# Patient Record
Sex: FEMALE | Race: WHITE | NOT HISPANIC OR LATINO | Employment: FULL TIME | URBAN - METROPOLITAN AREA
[De-identification: names, ages, dates, MRNs, and addresses within clinical notes are randomized per-mention and may not be internally consistent; named-entity substitution may affect disease eponyms.]

---

## 2022-10-19 ENCOUNTER — INITIAL PRENATAL (OUTPATIENT)
Dept: OBSTETRICS AND GYNECOLOGY | Facility: CLINIC | Age: 27
End: 2022-10-19
Payer: MEDICAID

## 2022-10-19 ENCOUNTER — PATIENT MESSAGE (OUTPATIENT)
Dept: OBSTETRICS AND GYNECOLOGY | Facility: CLINIC | Age: 27
End: 2022-10-19

## 2022-10-19 ENCOUNTER — TELEPHONE (OUTPATIENT)
Dept: OBSTETRICS AND GYNECOLOGY | Facility: CLINIC | Age: 27
End: 2022-10-19

## 2022-10-19 VITALS
WEIGHT: 188.5 LBS | BODY MASS INDEX: 31.4 KG/M2 | HEIGHT: 65 IN | HEART RATE: 120 BPM | DIASTOLIC BLOOD PRESSURE: 75 MMHG | SYSTOLIC BLOOD PRESSURE: 124 MMHG

## 2022-10-19 DIAGNOSIS — R00.0 TACHYCARDIA: ICD-10-CM

## 2022-10-19 DIAGNOSIS — R00.0 TACHYCARDIA: Primary | ICD-10-CM

## 2022-10-19 DIAGNOSIS — Z3A.26 26 WEEKS GESTATION OF PREGNANCY: ICD-10-CM

## 2022-10-19 DIAGNOSIS — O26.849 SIZE OF FETUS INCONSISTENT WITH DATES, ANTEPARTUM: Primary | ICD-10-CM

## 2022-10-19 LAB
AMPHET+METHAMPHET UR QL: NEGATIVE
BARBITURATES UR QL SCN>200 NG/ML: NEGATIVE
BENZODIAZ UR QL SCN>200 NG/ML: NEGATIVE
BZE UR QL SCN: NEGATIVE
CANNABINOIDS UR QL SCN: NEGATIVE
CREAT UR-MCNC: 108.7 MG/DL (ref 15–325)
METHADONE UR QL SCN>300 NG/ML: NEGATIVE
OPIATES UR QL SCN: NEGATIVE
PCP UR QL SCN>25 NG/ML: NEGATIVE
TOXICOLOGY INFORMATION: NORMAL

## 2022-10-19 PROCEDURE — 99204 OFFICE O/P NEW MOD 45 MIN: CPT | Mod: TH,S$GLB,,

## 2022-10-19 PROCEDURE — 87491 CHLMYD TRACH DNA AMP PROBE: CPT

## 2022-10-19 PROCEDURE — 99204 PR OFFICE/OUTPT VISIT, NEW, LEVL IV, 45-59 MIN: ICD-10-PCS | Mod: TH,S$GLB,,

## 2022-10-19 PROCEDURE — 87086 URINE CULTURE/COLONY COUNT: CPT

## 2022-10-19 PROCEDURE — 80307 DRUG TEST PRSMV CHEM ANLYZR: CPT

## 2022-10-19 PROCEDURE — 87591 N.GONORRHOEAE DNA AMP PROB: CPT

## 2022-10-19 PROCEDURE — 87147 CULTURE TYPE IMMUNOLOGIC: CPT

## 2022-10-19 PROCEDURE — 87088 URINE BACTERIA CULTURE: CPT

## 2022-10-19 NOTE — PROGRESS NOTES
"  10/19/2022  27 y.o. 26w0d Estimated Date of Delivery: 23, per 1st trimester u/s at 11w 6d on 22. Transfer from [facility].   OB History    Para Term  AB Living   1             SAB IAB Ectopic Multiple Live Births                  # Outcome Date GA Lbr Paul/2nd Weight Sex Delivery Anes PTL Lv   1 Current                 Here for scheduled SUMI visit. Doing well.  No lof/brvb, dysuria, fever/chills, nausea or emesis. No S&S of pre eclampsia or COIVD 19. Good FM noted. No cramps/regular contractions. Calm, pleasant, NAD. ROS negative with exception of aforementioned:    Allergies:  None    PMH:  None    Surghx:  [place] teeth extraction  Breast reduction    SOCHX:  Denies tobacco, etoh, substance use.    OBHX:    Hx of breast reduction, pt intersted in seeing lactation consultant  Size greater than dates, pt states she weighed 10# at birth.    LABS:  A pos/ ABS neg  CBC 11.2 hgb; plt 357  Rubella immune  HIV Neg  Hep B C Neg  RPR Non-reactive  GC/CHL neg  Urine cx no growth  Pap on 10/22/21 NILM  M21 Negative x 3, Male  Repeat GC/CHL, Urine cx, UDS today  GTT, CBC, Sickle cell next visit    Review of Systems:  General ROS: negative for headache or visual changes  Breast ROS: negative for breast lumps  Gastrointestinal ROS: negative for constipation, diarrhea or nausea/vomiting  Musculoskeletal ROS: negative for pain in joints or swelling in face or hands.   Neurological ROS: negative for - headaches, numbness/tingling or visual changes      Physical Exam:  /75   Pulse (!) 120   Ht 5' 5" (1.651 m)   Wt 85.5 kg (188 lb 8 oz)   LMP 2022   BMI 31.37 kg/m²   Urine Dip: neg/neg  FHT: Fetal Heart Rate: 130s    Constitutional: She is oriented to person, place, and time. She appears well-developed and well-nourished. No distress.   Pulmonary/Chest: Effort normal. No respiratory distress. + tachycardia. Pt desires EKG she wants to see her aunt who works at [employer], referral " placed.   Abdominal: Soft, gravid, nontender. No rebound and no guarding. Fundal Height: Fundal Height (cm): 28 cm S>D  Genitourinary: Deferred   Musculoskeletal: Normal range of motion. Minimal peripheral edema.   Neurological: She is alert and oriented to person, place, and time. Coordination normal. Gait smooth and steady  Skin: Skin is warm and dry. She is not diaphoretic.  Psychiatric: She has a normal mood and affect.      Assessment:   27 y.o., at 26w0d Gestation   Patient Active Problem List   Diagnosis    26 weeks gestation of pregnancy    Size of fetus inconsistent with dates, antepartum    Tachycardia     Current Outpatient Medications on File Prior to Visit   Medication Sig Dispense Refill    prenatal 25/iron fum/folic/dha (PRENATAL-1 ORAL) Take 1 tablet by mouth once daily.       No current facility-administered medications on file prior to visit.       Plan:  1. Oriented to our collaborative group.  2. S&S of PTL/PPROM  reinforced.  3. Continue home meds/daily PNV.  4. NIPT negative x 3, male.  5. EIF on Anatomy u/s otherwise normal.  6. Size greater than dates, growth u/s ordered.  7. Urine cx, GC/CHL, UDS today.  8. GTT and CBC at next visit.  9. Mild tachycardia at today's visit. HR range from  predominantly at 120. Pt desires cardiology referral for EKG.  10. SUMI 2 weeks.

## 2022-10-20 LAB
C TRACH DNA SPEC QL NAA+PROBE: NOT DETECTED
N GONORRHOEA DNA SPEC QL NAA+PROBE: NOT DETECTED

## 2022-10-21 ENCOUNTER — PATIENT MESSAGE (OUTPATIENT)
Dept: OBSTETRICS AND GYNECOLOGY | Facility: CLINIC | Age: 27
End: 2022-10-21
Payer: MEDICAID

## 2022-10-21 DIAGNOSIS — N30.00 ACUTE CYSTITIS WITHOUT HEMATURIA: Primary | ICD-10-CM

## 2022-10-21 LAB — BACTERIA UR CULT: ABNORMAL

## 2022-10-21 RX ORDER — AMPICILLIN 500 MG/1
500 CAPSULE ORAL 3 TIMES DAILY
Qty: 21 CAPSULE | Refills: 0 | Status: SHIPPED | OUTPATIENT
Start: 2022-10-21 | End: 2022-10-28

## 2022-10-21 RX ORDER — AMPICILLIN 500 MG/1
500 CAPSULE ORAL 3 TIMES DAILY
Qty: 21 CAPSULE | Refills: 0 | Status: SHIPPED | OUTPATIENT
Start: 2022-10-21 | End: 2022-10-21 | Stop reason: SDUPTHER

## 2022-10-25 ENCOUNTER — TELEPHONE (OUTPATIENT)
Dept: OBSTETRICS AND GYNECOLOGY | Facility: CLINIC | Age: 27
End: 2022-10-25
Payer: MEDICAID

## 2022-10-25 NOTE — TELEPHONE ENCOUNTER
Encounter summary along with provider NPI faxed to requested number.    Simple: Patient demonstrates quick and easy understanding

## 2022-11-01 ENCOUNTER — PATIENT MESSAGE (OUTPATIENT)
Dept: OBSTETRICS AND GYNECOLOGY | Facility: CLINIC | Age: 27
End: 2022-11-01

## 2022-11-01 ENCOUNTER — ROUTINE PRENATAL (OUTPATIENT)
Dept: OBSTETRICS AND GYNECOLOGY | Facility: CLINIC | Age: 27
End: 2022-11-01
Payer: MEDICAID

## 2022-11-01 ENCOUNTER — LAB VISIT (OUTPATIENT)
Dept: LAB | Facility: CLINIC | Age: 27
End: 2022-11-01
Payer: MEDICAID

## 2022-11-01 VITALS
DIASTOLIC BLOOD PRESSURE: 73 MMHG | BODY MASS INDEX: 32.05 KG/M2 | SYSTOLIC BLOOD PRESSURE: 132 MMHG | WEIGHT: 192.63 LBS | HEART RATE: 101 BPM

## 2022-11-01 DIAGNOSIS — Z3A.26 26 WEEKS GESTATION OF PREGNANCY: ICD-10-CM

## 2022-11-01 DIAGNOSIS — Z3A.27 27 WEEKS GESTATION OF PREGNANCY: Primary | ICD-10-CM

## 2022-11-01 DIAGNOSIS — D50.8 OTHER IRON DEFICIENCY ANEMIA: Primary | ICD-10-CM

## 2022-11-01 DIAGNOSIS — O26.849 SIZE OF FETUS INCONSISTENT WITH DATES, ANTEPARTUM: ICD-10-CM

## 2022-11-01 DIAGNOSIS — R00.0 TACHYCARDIA: ICD-10-CM

## 2022-11-01 LAB
BASOPHILS # BLD AUTO: 0.11 K/UL (ref 0–0.2)
BASOPHILS NFR BLD: 0.7 % (ref 0–1.9)
DIFFERENTIAL METHOD: ABNORMAL
EOSINOPHIL # BLD AUTO: 0.1 K/UL (ref 0–0.5)
EOSINOPHIL NFR BLD: 0.9 % (ref 0–8)
ERYTHROCYTE [DISTWIDTH] IN BLOOD BY AUTOMATED COUNT: 14.9 % (ref 11.5–14.5)
GLUCOSE SERPL-MCNC: 98 MG/DL (ref 70–140)
HCT VFR BLD AUTO: 31.3 % (ref 37–48.5)
HGB BLD-MCNC: 9.3 G/DL (ref 12–16)
HGB S BLD QL SOLY: NEGATIVE
IMM GRANULOCYTES # BLD AUTO: 0.77 K/UL (ref 0–0.04)
IMM GRANULOCYTES NFR BLD AUTO: 4.7 % (ref 0–0.5)
LYMPHOCYTES # BLD AUTO: 2.1 K/UL (ref 1–4.8)
LYMPHOCYTES NFR BLD: 12.6 % (ref 18–48)
MCH RBC QN AUTO: 22 PG (ref 27–31)
MCHC RBC AUTO-ENTMCNC: 29.7 G/DL (ref 32–36)
MCV RBC AUTO: 74 FL (ref 82–98)
MONOCYTES # BLD AUTO: 0.8 K/UL (ref 0.3–1)
MONOCYTES NFR BLD: 4.9 % (ref 4–15)
NEUTROPHILS # BLD AUTO: 12.4 K/UL (ref 1.8–7.7)
NEUTROPHILS NFR BLD: 76.2 % (ref 38–73)
NRBC BLD-RTO: 0 /100 WBC
PLATELET # BLD AUTO: 296 K/UL (ref 150–450)
PMV BLD AUTO: 10.3 FL (ref 9.2–12.9)
RBC # BLD AUTO: 4.22 M/UL (ref 4–5.4)
WBC # BLD AUTO: 16.23 K/UL (ref 3.9–12.7)

## 2022-11-01 PROCEDURE — 85660 RBC SICKLE CELL TEST: CPT

## 2022-11-01 PROCEDURE — 36415 COLL VENOUS BLD VENIPUNCTURE: CPT | Mod: ,,, | Performed by: STUDENT IN AN ORGANIZED HEALTH CARE EDUCATION/TRAINING PROGRAM

## 2022-11-01 PROCEDURE — 99213 OFFICE O/P EST LOW 20 MIN: CPT | Mod: TH,S$GLB,,

## 2022-11-01 PROCEDURE — 85025 COMPLETE CBC W/AUTO DIFF WBC: CPT

## 2022-11-01 PROCEDURE — 82950 GLUCOSE TEST: CPT

## 2022-11-01 PROCEDURE — 99213 PR OFFICE/OUTPT VISIT, EST, LEVL III, 20-29 MIN: ICD-10-PCS | Mod: TH,S$GLB,,

## 2022-11-01 PROCEDURE — 36415 PR COLLECTION VENOUS BLOOD,VENIPUNCTURE: ICD-10-PCS | Mod: ,,, | Performed by: STUDENT IN AN ORGANIZED HEALTH CARE EDUCATION/TRAINING PROGRAM

## 2022-11-01 RX ORDER — FERROUS SULFATE 325(65) MG
325 TABLET, DELAYED RELEASE (ENTERIC COATED) ORAL DAILY
Qty: 30 TABLET | Refills: 5 | Status: SHIPPED | OUTPATIENT
Start: 2022-11-01 | End: 2023-04-30

## 2022-11-01 NOTE — PROGRESS NOTES
2022  27 y.o. 27w6d Estimated Date of Delivery: 23, per 1st trimester u/s at 11w 6d on 22. Transfer from [facility].   OB History    Para Term  AB Living   1             SAB IAB Ectopic Multiple Live Births                  # Outcome Date GA Lbr Paul/2nd Weight Sex Delivery Anes PTL Lv   1 Current                 Here for scheduled SUMI visit. Doing well.  No lof/brvb, dysuria, fever/chills, nausea or emesis. No S&S of pre eclampsia or COIVD 19. Good FM noted. No cramps/regular contractions. Calm, pleasant, NAD. ROS negative with exception of aforementioned:    Allergies:  None    PMH:  None    Surghx:  [place] teeth extraction  Breast reduction    SOCHX:  Denies tobacco, etoh, substance use.    OBHX:    Hx of breast reduction, pt intersted in seeing lactation consultant  Size greater than dates, pt states she weighed 10# at birth.    LABS:  A pos/ ABS neg  CBC 11.2 hgb; plt 357  Rubella immune  HIV Neg  Hep B C Neg  RPR Non-reactive  GC/CHL neg  Urine cx no growth  Pap on 10/22/21 NILM  M21 Negative x 3, Male  UDS Neg  GTT, CBC, Sickle cell today    Review of Systems:  General ROS: negative for headache or visual changes  Breast ROS: negative for breast lumps  Gastrointestinal ROS: negative for constipation, diarrhea or nausea/vomiting  Musculoskeletal ROS: negative for pain in joints or swelling in face or hands.   Neurological ROS: negative for - headaches, numbness/tingling or visual changes      Physical Exam:  /73   Pulse 101   Wt 87.4 kg (192 lb 9.6 oz)   LMP 2022   BMI 32.05 kg/m²   Urine Dip: neg/neg  FHT: Fetal Heart Rate: 140s    Constitutional: She is oriented to person, place, and time. She appears well-developed and well-nourished. No distress.   Pulmonary/Chest: Effort normal. No respiratory distress.    Abdominal: Soft, gravid, nontender. No rebound and no guarding. Fundal Height: Fundal Height (cm): 29 cm S>D  Genitourinary: Deferred    Musculoskeletal: Normal range of motion. Minimal peripheral edema.   Neurological: She is alert and oriented to person, place, and time. Coordination normal. Gait smooth and steady  Skin: Skin is warm and dry. She is not diaphoretic.  Psychiatric: She has a normal mood and affect.      Assessment:   27 y.o., at 27w6d Gestation   Patient Active Problem List   Diagnosis    Size of fetus inconsistent with dates, antepartum    Tachycardia    27 weeks gestation of pregnancy     Current Outpatient Medications on File Prior to Visit   Medication Sig Dispense Refill    prenatal 25/iron fum/folic/dha (PRENATAL-1 ORAL) Take 1 tablet by mouth once daily.       No current facility-administered medications on file prior to visit.       Plan:  1. Oriented to our collaborative group.  2. S&S of PTL/PPROM  reinforced.  3. Continue home meds/daily PNV.  4. NIPT negative x 3, male.  5. EIF on Anatomy u/s otherwise normal.  6. Size greater than dates, growth u/s on 11/30/2022.  7. Urine cx, GC/CHL, UDS WNL.  8. GTT and CBC today.  9. Mild tachycardia at last visit. HR range from  predominantly at 120. Pt has appt with Cardiology on 11/9/22.  10. SUMI 3 weeks.

## 2022-11-09 ENCOUNTER — TELEPHONE (OUTPATIENT)
Dept: OBSTETRICS AND GYNECOLOGY | Facility: CLINIC | Age: 27
End: 2022-11-09
Payer: MEDICAID

## 2022-11-30 ENCOUNTER — PROCEDURE VISIT (OUTPATIENT)
Dept: MATERNAL FETAL MEDICINE | Facility: CLINIC | Age: 27
End: 2022-11-30
Payer: MEDICAID

## 2022-11-30 ENCOUNTER — ROUTINE PRENATAL (OUTPATIENT)
Dept: OBSTETRICS AND GYNECOLOGY | Facility: CLINIC | Age: 27
End: 2022-11-30
Payer: MEDICAID

## 2022-11-30 VITALS
WEIGHT: 200.38 LBS | BODY MASS INDEX: 33.35 KG/M2 | DIASTOLIC BLOOD PRESSURE: 81 MMHG | SYSTOLIC BLOOD PRESSURE: 136 MMHG

## 2022-11-30 DIAGNOSIS — O26.849 SIZE OF FETUS INCONSISTENT WITH DATES, ANTEPARTUM: ICD-10-CM

## 2022-11-30 DIAGNOSIS — Z3A.32 32 WEEKS GESTATION OF PREGNANCY: Primary | ICD-10-CM

## 2022-11-30 DIAGNOSIS — Z3A.26 26 WEEKS GESTATION OF PREGNANCY: ICD-10-CM

## 2022-11-30 DIAGNOSIS — R00.0 TACHYCARDIA: ICD-10-CM

## 2022-11-30 PROCEDURE — 76816 OB US FOLLOW-UP PER FETUS: CPT | Mod: S$GLB,,, | Performed by: OBSTETRICS & GYNECOLOGY

## 2022-11-30 PROCEDURE — 99214 OFFICE O/P EST MOD 30 MIN: CPT | Mod: TH,S$GLB,,

## 2022-11-30 PROCEDURE — 99214 PR OFFICE/OUTPT VISIT, EST, LEVL IV, 30-39 MIN: ICD-10-PCS | Mod: TH,S$GLB,,

## 2022-11-30 PROCEDURE — 76816 US OB/GYN PROCEDURE (VIEWPOINT): ICD-10-PCS | Mod: S$GLB,,, | Performed by: OBSTETRICS & GYNECOLOGY

## 2022-11-30 NOTE — PROGRESS NOTES
2022  27 y.o. 32w0d Estimated Date of Delivery: 23, per 1st trimester u/s at 11w 6d on 22. Transfer from [facility].   OB History    Para Term  AB Living   1             SAB IAB Ectopic Multiple Live Births                  # Outcome Date GA Lbr Paul/2nd Weight Sex Delivery Anes PTL Lv   1 Current                 Here for scheduled SUMI visit. Doing well.  No lof/brvb, dysuria, fever/chills, nausea or emesis. No S&S of pre eclampsia or COIVD 19. Good FM noted. No cramps/regular contractions. Calm, pleasant, NAD. ROS negative with exception of aforementioned:    At initial OB patient noted to have tachycardia with regular rhythm. She requested cardiology referral at that time. Pt referred to Dr. Foote for EKG. Pt has been consulted by cardiology and found to have normal EKG. She said she has follow up appt for echocardiogram as well.     Pt had growth ultrasound today due to size greater than dates. U/S at 32w0d EFW 4#14oz, 50%. Normal growth.     Allergies:  None    PMH:  None    Surghx:  [place] teeth extraction  Breast reduction    SOCHX:  Denies tobacco, etoh, substance use.    OBHX:    Hx of breast reduction, pt intersted in seeing lactation consultant  Size greater than dates, pt states she weighed 10# at birth.    LABS:  A pos/ ABS neg  CBC 11.2 hgb; plt 357  Rubella immune  HIV Neg  Hep B C Neg  RPR Non-reactive  GC/CHL neg  Urine cx no growth  Pap on 10/22/21 NILM  M21 Negative x 3, Male  UDS Neg  GTT 98  CBC 9.3/31.3/296 - pt now taking daily ferrous sulfate  Sickle Cell Neg    Review of Systems:  General ROS: negative for headache or visual changes  Breast ROS: negative for breast lumps  Gastrointestinal ROS: negative for constipation, diarrhea or nausea/vomiting  Musculoskeletal ROS: negative for pain in joints or swelling in face or hands.   Neurological ROS: negative for - headaches, numbness/tingling or visual changes      Physical Exam:  /81   Wt 90.9 kg  (200 lb 6.4 oz)   LMP 04/02/2022   BMI 33.35 kg/m²   Urine Dip: neg/trace glucose  FHT: Fetal Heart Rate: 130s    Constitutional: She is oriented to person, place, and time. She appears well-developed and well-nourished. No distress.   Pulmonary/Chest: Effort normal. No respiratory distress.    Abdominal: Soft, gravid, nontender. No rebound and no guarding. Fundal Height: Fundal Height (cm): 33 cm   Genitourinary: Deferred   Musculoskeletal: Normal range of motion. Minimal peripheral edema.   Neurological: She is alert and oriented to person, place, and time. Coordination normal. Gait smooth and steady  Skin: Skin is warm and dry. She is not diaphoretic.  Psychiatric: She has a normal mood and affect.      Assessment:   27 y.o., at 32w0d Gestation   Patient Active Problem List   Diagnosis    Size of fetus inconsistent with dates, antepartum    Tachycardia    32 weeks gestation of pregnancy     Current Outpatient Medications on File Prior to Visit   Medication Sig Dispense Refill    ferrous sulfate 325 (65 FE) MG EC tablet Take 1 tablet (325 mg total) by mouth once daily. 30 tablet 5    prenatal 25/iron fum/folic/dha (PRENATAL-1 ORAL) Take 1 tablet by mouth once daily.       No current facility-administered medications on file prior to visit.       Plan:  Oriented to our collaborative group.  S&S of PTL/PPROM  reinforced.  Continue home meds/daily PNV, Iron.  NIPT negative x 3, male.  EIF on Anatomy u/s otherwise normal.  Size greater than dates, growth u/s at 32w0d 4#14oz, 50%.  Urine cx, GC/CHL, UDS WNL.  Mild tachycardia at initial visit. HR range from  predominantly at 120. Pt had EKG with Dr. Foote WNL per pt report.  SUMI 2 weeks.

## 2022-12-04 PROBLEM — Z3A.32 32 WEEKS GESTATION OF PREGNANCY: Status: ACTIVE | Noted: 2022-12-04

## 2022-12-04 PROBLEM — Z3A.27 27 WEEKS GESTATION OF PREGNANCY: Status: RESOLVED | Noted: 2022-11-01 | Resolved: 2022-12-04

## 2022-12-10 ENCOUNTER — PATIENT MESSAGE (OUTPATIENT)
Dept: OBSTETRICS AND GYNECOLOGY | Facility: CLINIC | Age: 27
End: 2022-12-10
Payer: MEDICAID

## 2022-12-11 ENCOUNTER — PATIENT MESSAGE (OUTPATIENT)
Dept: OBSTETRICS AND GYNECOLOGY | Facility: CLINIC | Age: 27
End: 2022-12-11
Payer: MEDICAID

## 2022-12-11 DIAGNOSIS — B37.9 YEAST INFECTION: Primary | ICD-10-CM

## 2022-12-11 RX ORDER — FLUCONAZOLE 200 MG/1
200 TABLET ORAL DAILY
Qty: 3 TABLET | Refills: 0 | Status: SHIPPED | OUTPATIENT
Start: 2022-12-11 | End: 2022-12-14

## 2022-12-14 ENCOUNTER — ROUTINE PRENATAL (OUTPATIENT)
Dept: OBSTETRICS AND GYNECOLOGY | Facility: CLINIC | Age: 27
End: 2022-12-14
Payer: MEDICAID

## 2022-12-14 VITALS
BODY MASS INDEX: 33.7 KG/M2 | HEART RATE: 131 BPM | DIASTOLIC BLOOD PRESSURE: 68 MMHG | WEIGHT: 202.5 LBS | SYSTOLIC BLOOD PRESSURE: 113 MMHG

## 2022-12-14 DIAGNOSIS — R00.0 TACHYCARDIA: ICD-10-CM

## 2022-12-14 DIAGNOSIS — O26.843 SIZE OF FETUS INCONSISTENT WITH DATES IN THIRD TRIMESTER: ICD-10-CM

## 2022-12-14 DIAGNOSIS — Z3A.34 34 WEEKS GESTATION OF PREGNANCY: Primary | ICD-10-CM

## 2022-12-14 PROBLEM — Z3A.32 32 WEEKS GESTATION OF PREGNANCY: Status: RESOLVED | Noted: 2022-12-04 | Resolved: 2022-12-14

## 2022-12-14 PROCEDURE — 59425 PR ANTEPARTUM CARE ONLY, 4-6 VISITS: ICD-10-PCS | Mod: TH,S$GLB,,

## 2022-12-14 PROCEDURE — 59425 ANTEPARTUM CARE ONLY: CPT | Mod: TH,S$GLB,,

## 2022-12-14 NOTE — PROGRESS NOTES
2022  27 y.o. 34w0d Estimated Date of Delivery: 23, per 1st trimester u/s at 11w 6d on 22. Transfer from [facility].   OB History    Para Term  AB Living   1             SAB IAB Ectopic Multiple Live Births                  # Outcome Date GA Lbr Paul/2nd Weight Sex Delivery Anes PTL Lv   1 Current                 Here for scheduled SUMI visit. Doing well.  No lof/brvb, dysuria, fever/chills, nausea or emesis. No S&S of pre eclampsia or COIVD 19. Good FM noted. No cramps/regular contractions. Calm, pleasant, NAD. ROS negative with exception of aforementioned:    At initial OB patient noted to have tachycardia with regular rhythm. She requested cardiology referral at that time. Pt is seeing Dr. Foote for cardiology. Pt has been consulted by cardiology and found to have normal EKG and echo. She is scheduled for follow up cardiology in 2023.     Pt had growth ultrasound at 32w0d due to size greater than dates. U/S at 32w0d EFW 4#14oz, 50%. Normal growth. Pt remains size greater than dates. Pt herself weighed 10# at birth. Repeat growth u/s ordered for 36 weeks. Pt verbalizes understanding.     Allergies:  None    PMH:  None    Surghx:  [place] teeth extraction  Breast reduction    SOCHX:  Denies tobacco, etoh, substance use.    OBHX:    Hx of breast reduction, pt intersted in seeing lactation consultant  Size greater than dates, pt states she weighed 10# at birth.    LABS:  A pos/ ABS neg  CBC 11.2 hgb; plt 357  Rubella immune  HIV Neg  Hep B C Neg  RPR Non-reactive  GC/CHL neg  Urine cx no growth  Pap on 10/22/21 NILM  M21 Negative x 3, Male  UDS Neg  GTT 98  CBC 9.3/31.3/296 - pt now taking daily ferrous sulfate  Sickle Cell Neg  GBS + RPR Next Visit.     Review of Systems:  General ROS: negative for headache or visual changes  Breast ROS: negative for breast lumps  Gastrointestinal ROS: negative for constipation, diarrhea or nausea/vomiting  Musculoskeletal ROS: negative for  pain in joints or swelling in face or hands.   Neurological ROS: negative for - headaches, numbness/tingling or visual changes      Physical Exam:  /68   Pulse (!) 131   Wt 91.9 kg (202 lb 8 oz)   LMP 04/02/2022   BMI 33.70 kg/m²   Urine Dip: neg/neg  FHT: Fetal Heart Rate: 150s    Constitutional: She is oriented to person, place, and time. She appears well-developed and well-nourished. No distress.   Pulmonary/Chest: Effort normal. No respiratory distress.    Abdominal: Soft, gravid, nontender. No rebound and no guarding. Fundal Height: Fundal Height (cm): 37 cm S>D  Genitourinary: Deferred   Musculoskeletal: Normal range of motion. Minimal peripheral edema.   Neurological: She is alert and oriented to person, place, and time. Coordination normal. Gait smooth and steady  Skin: Skin is warm and dry. She is not diaphoretic.  Psychiatric: She has a normal mood and affect.      Assessment:   27 y.o., at 34w0d Gestation   Patient Active Problem List   Diagnosis    Size of fetus inconsistent with dates, antepartum    Tachycardia    34 weeks gestation of pregnancy     Current Outpatient Medications on File Prior to Visit   Medication Sig Dispense Refill    ferrous sulfate 325 (65 FE) MG EC tablet Take 1 tablet (325 mg total) by mouth once daily. 30 tablet 5    fluconazole (DIFLUCAN) 200 MG Tab Take 1 tablet (200 mg total) by mouth once daily. for 3 days 3 tablet 0    prenatal 25/iron fum/folic/dha (PRENATAL-1 ORAL) Take 1 tablet by mouth once daily.       No current facility-administered medications on file prior to visit.       Plan:  Oriented to our collaborative group.  S&S of PTL/PPROM  reinforced.  Continue home meds/daily PNV, Iron.  NIPT negative x 3, male.  EIF on Anatomy u/s otherwise normal.  Size greater than dates, growth u/s at 32w0d 4#14oz, 50%. Repeat growth at 36 weeks.  Mild tachycardia at initial visit. Pt had EKG and echo with JENNIFER RandallL per pt report.  GBS + RPR Next Visit.  SUMI 2  weeks.

## 2022-12-25 ENCOUNTER — PATIENT MESSAGE (OUTPATIENT)
Dept: OBSTETRICS AND GYNECOLOGY | Facility: CLINIC | Age: 27
End: 2022-12-25
Payer: MEDICAID

## 2022-12-27 ENCOUNTER — PROCEDURE VISIT (OUTPATIENT)
Dept: MATERNAL FETAL MEDICINE | Facility: CLINIC | Age: 27
End: 2022-12-27
Payer: MEDICAID

## 2022-12-27 ENCOUNTER — ROUTINE PRENATAL (OUTPATIENT)
Dept: OBSTETRICS AND GYNECOLOGY | Facility: CLINIC | Age: 27
End: 2022-12-27
Payer: MEDICAID

## 2022-12-27 VITALS
WEIGHT: 199.88 LBS | HEART RATE: 123 BPM | SYSTOLIC BLOOD PRESSURE: 133 MMHG | BODY MASS INDEX: 33.27 KG/M2 | DIASTOLIC BLOOD PRESSURE: 81 MMHG

## 2022-12-27 DIAGNOSIS — R00.0 TACHYCARDIA: ICD-10-CM

## 2022-12-27 DIAGNOSIS — Z3A.34 34 WEEKS GESTATION OF PREGNANCY: ICD-10-CM

## 2022-12-27 DIAGNOSIS — Z3A.35 35 WEEKS GESTATION OF PREGNANCY: Primary | ICD-10-CM

## 2022-12-27 DIAGNOSIS — R09.81 NASAL CONGESTION: ICD-10-CM

## 2022-12-27 DIAGNOSIS — O26.843 SIZE OF FETUS INCONSISTENT WITH DATES IN THIRD TRIMESTER: ICD-10-CM

## 2022-12-27 PROBLEM — O26.849 SIZE OF FETUS INCONSISTENT WITH DATES, ANTEPARTUM: Status: RESOLVED | Noted: 2022-10-19 | Resolved: 2022-12-27

## 2022-12-27 PROCEDURE — 76816 OB US FOLLOW-UP PER FETUS: CPT | Mod: S$GLB,,, | Performed by: OBSTETRICS & GYNECOLOGY

## 2022-12-27 PROCEDURE — 87184 SC STD DISK METHOD PER PLATE: CPT

## 2022-12-27 PROCEDURE — 87081 CULTURE SCREEN ONLY: CPT

## 2022-12-27 PROCEDURE — 59425 ANTEPARTUM CARE ONLY: CPT | Mod: TH,S$GLB,,

## 2022-12-27 PROCEDURE — 87147 CULTURE TYPE IMMUNOLOGIC: CPT

## 2022-12-27 PROCEDURE — 59425 PR ANTEPARTUM CARE ONLY, 4-6 VISITS: ICD-10-PCS | Mod: TH,S$GLB,,

## 2022-12-27 PROCEDURE — 76816 US OB/GYN PROCEDURE (VIEWPOINT): ICD-10-PCS | Mod: S$GLB,,, | Performed by: OBSTETRICS & GYNECOLOGY

## 2022-12-27 RX ORDER — FLUTICASONE PROPIONATE 50 MCG
2 SPRAY, SUSPENSION (ML) NASAL DAILY
Qty: 9.9 ML | Refills: 0 | Status: SHIPPED | OUTPATIENT
Start: 2022-12-27 | End: 2023-01-01

## 2022-12-27 NOTE — PROGRESS NOTES
2022  27 y.o. 35w6d Estimated Date of Delivery: 23, per 1st trimester u/s at 11w 6d on 22. Transfer from [facility].   OB History    Para Term  AB Living   1             SAB IAB Ectopic Multiple Live Births                  # Outcome Date GA Lbr Paul/2nd Weight Sex Delivery Anes PTL Lv   1 Current                 Here for scheduled SUMI visit. Doing well.  No lof/brvb, dysuria, fever/chills, nausea or emesis. No S&S of pre eclampsia or COIVD 19. Good FM noted. No cramps/regular contractions. Calm, pleasant, NAD. ROS negative with exception of aforementioned:    At initial OB patient noted to have tachycardia with regular rhythm. She requested cardiology referral at that time. Pt is seeing Dr. Foote for cardiology. Pt has been consulted by cardiology and found to have normal EKG and echo. She is scheduled for follow up cardiology in 2023.     Growth u/s at 35w6d EFW 6#6oz 44%; AC 57%. Normal interval growth.     Allergies:  None    PMH:  None    Surghx:  [place] teeth extraction  Breast reduction    SOCHX:  Denies tobacco, etoh, substance use.    OBHX:    Hx of breast reduction, pt intersted in seeing lactation consultant  Size greater than dates, pt states she weighed 10# at birth.    LABS:  A pos/ ABS neg  CBC 11.2 hgb; plt 357  Rubella immune  HIV Neg  Hep B C Neg  RPR Non-reactive  GC/CHL neg  Urine cx no growth  Pap on 10/22/21 NILM  M21 Negative x 3, Male  UDS Neg  GTT 98  CBC 9.3/31.3/296 - pt now taking daily ferrous sulfate  Sickle Cell Neg  GBS collected today  RPR and CBC next visit    Review of Systems:  General ROS: negative for headache or visual changes  Breast ROS: negative for breast lumps  Gastrointestinal ROS: negative for constipation, diarrhea or nausea/vomiting  Musculoskeletal ROS: negative for pain in joints or swelling in face or hands.   Neurological ROS: negative for - headaches, numbness/tingling or visual changes      Physical Exam:  /81    Pulse (!) 123   Wt 90.7 kg (199 lb 14.4 oz)   LMP 04/02/2022   BMI 33.27 kg/m²   Urine Dip: neg/neg  FHT: Fetal Heart Rate: 150s    Constitutional: She is oriented to person, place, and time. She appears well-developed and well-nourished. No distress.   Pulmonary/Chest: Effort normal. No respiratory distress.    Abdominal: Soft, gravid, nontender. No rebound and no guarding. Fundal Height: Fundal Height (cm): 36 cm S=D  Genitourinary: VE: 1/50/-3 no blood or fluid on glove. SDuran Camacho present as chaperone.  Musculoskeletal: Normal range of motion. Minimal peripheral edema.   Neurological: She is alert and oriented to person, place, and time. Coordination normal. Gait smooth and steady  Skin: Skin is warm and dry. She is not diaphoretic.  Psychiatric: She has a normal mood and affect.      Assessment:   27 y.o., at 35w6d Gestation   Patient Active Problem List   Diagnosis    Tachycardia    35 weeks gestation of pregnancy     Current Outpatient Medications on File Prior to Visit   Medication Sig Dispense Refill    ferrous sulfate 325 (65 FE) MG EC tablet Take 1 tablet (325 mg total) by mouth once daily. 30 tablet 5    prenatal 25/iron fum/folic/dha (PRENATAL-1 ORAL) Take 1 tablet by mouth once daily.       No current facility-administered medications on file prior to visit.       Plan:  Oriented to our collaborative group.  S&S of PTL/PPROM  reinforced.  Continue home meds/daily PNV, Iron.  NIPT negative x 3, male.  EIF on Anatomy u/s otherwise normal.  Size greater than dates, growth u/s at 32w0d 4#14oz, 50%. Repeat growth at 36 weeks.  Mild tachycardia at initial visit. Pt had EKG and echo with MINH Randall per pt report.  GBS collected today.  RPR and CBC next visit.  Weekly SUMI until delivery.

## 2022-12-29 LAB — BACTERIA SPEC AEROBE CULT: ABNORMAL

## 2023-01-03 ENCOUNTER — PATIENT MESSAGE (OUTPATIENT)
Dept: OBSTETRICS AND GYNECOLOGY | Facility: CLINIC | Age: 28
End: 2023-01-03
Payer: MEDICAID

## 2023-01-04 ENCOUNTER — ROUTINE PRENATAL (OUTPATIENT)
Dept: OBSTETRICS AND GYNECOLOGY | Facility: CLINIC | Age: 28
End: 2023-01-04
Payer: MEDICAID

## 2023-01-04 VITALS
HEART RATE: 126 BPM | WEIGHT: 205.81 LBS | BODY MASS INDEX: 34.25 KG/M2 | DIASTOLIC BLOOD PRESSURE: 78 MMHG | SYSTOLIC BLOOD PRESSURE: 124 MMHG

## 2023-01-04 DIAGNOSIS — B95.1 POSITIVE GBS TEST: ICD-10-CM

## 2023-01-04 DIAGNOSIS — R00.0 TACHYCARDIA: ICD-10-CM

## 2023-01-04 DIAGNOSIS — Z3A.37 37 WEEKS GESTATION OF PREGNANCY: Primary | ICD-10-CM

## 2023-01-04 PROBLEM — Z3A.35 35 WEEKS GESTATION OF PREGNANCY: Status: RESOLVED | Noted: 2022-12-27 | Resolved: 2023-01-04

## 2023-01-04 PROCEDURE — 59425 ANTEPARTUM CARE ONLY: CPT | Mod: TH,S$GLB,,

## 2023-01-04 PROCEDURE — 59425 PR ANTEPARTUM CARE ONLY, 4-6 VISITS: ICD-10-PCS | Mod: TH,S$GLB,,

## 2023-01-04 NOTE — PROGRESS NOTES
2023  27 y.o. 37w0d Estimated Date of Delivery: 23, per 1st trimester u/s at 11w 6d on 22. Transfer from [facility].   OB History    Para Term  AB Living   1             SAB IAB Ectopic Multiple Live Births                  # Outcome Date GA Lbr Paul/2nd Weight Sex Delivery Anes PTL Lv   1 Current              Pt seen in triage yesterday for ?SROM. SROM ruled out in triage. VE was /-3. Pt denies LOF, VB, CTX at this time. Endorses +FM.     Here for scheduled SUMI visit. Doing well.  No lof/brvb, dysuria, fever/chills, nausea or emesis. No S&S of pre eclampsia or COIVD 19. Good FM noted. No cramps/regular contractions. Calm, pleasant, NAD. ROS negative with exception of aforementioned:    At initial OB patient noted to have tachycardia with regular rhythm. She requested cardiology referral at that time. Pt is seeing Dr. Foote for cardiology. Pt has been consulted by cardiology and found to have normal EKG and echo. She is scheduled for follow up cardiology in 2023.     Growth u/s at 35w6d EFW 6#6oz 44%; AC 57%. Normal interval growth.     Birth plan reviewed. Pt desires spontaneous labor. Planning epidural in labor.    Allergies:  None    PMH:  None    Surghx:  [place] teeth extraction  Breast reduction    SOCHX:  Denies tobacco, etoh, substance use.    OBHX:    Hx of breast reduction, pt intersted in seeing lactation consultant  Size greater than dates, pt states she weighed 10# at birth.  Growth u/s at 35w6d EFW 6#6oz 44%; AC 57%. Normal interval growth.   Anemia  Maternal sinus tachycardia; normal EKG and Echo with cardiology    LABS:  A pos/ ABS neg  CBC 11.2 hgb; plt 357  Rubella immune  HIV Neg  Hep B C Neg  RPR Non-reactive  GC/CHL neg  Urine cx no growth  Pap on 10/22/21 NILM  M21 Negative x 3, Male  UDS Neg  GTT 98  CBC 9.3/31.3/296 - pt now taking daily ferrous sulfate  Sickle Cell Neg  GBS POS  RPR and CBC next visit    Review of Systems:  General ROS:  negative for headache or visual changes  Breast ROS: negative for breast lumps  Gastrointestinal ROS: negative for constipation, diarrhea or nausea/vomiting  Musculoskeletal ROS: negative for pain in joints or swelling in face or hands.   Neurological ROS: negative for - headaches, numbness/tingling or visual changes      Physical Exam:  /78   Pulse (!) 126   Wt 93.4 kg (205 lb 12.8 oz)   LMP 04/02/2022   BMI 34.25 kg/m²   Urine Dip: neg/neg  FHT: Fetal Heart Rate: 150s    Constitutional: She is oriented to person, place, and time. She appears well-developed and well-nourished. No distress.   Pulmonary/Chest: Effort normal. No respiratory distress.    Abdominal: Soft, gravid, nontender. No rebound and no guarding. Fundal Height: Fundal Height (cm): 37 cm S=D  Genitourinary: VE: 1/50/-3 no blood or fluid on glove. SDuran Camacho present as chaperone.  Musculoskeletal: Normal range of motion. Minimal peripheral edema.   Neurological: She is alert and oriented to person, place, and time. Coordination normal. Gait smooth and steady  Skin: Skin is warm and dry. She is not diaphoretic.  Psychiatric: She has a normal mood and affect.      Assessment:   27 y.o., at 37w0d Gestation   Patient Active Problem List   Diagnosis    Tachycardia    37 weeks gestation of pregnancy    Positive GBS test     Current Outpatient Medications on File Prior to Visit   Medication Sig Dispense Refill    ferrous sulfate 325 (65 FE) MG EC tablet Take 1 tablet (325 mg total) by mouth once daily. 30 tablet 5    prenatal 25/iron fum/folic/dha (PRENATAL-1 ORAL) Take 1 tablet by mouth once daily.       No current facility-administered medications on file prior to visit.       Plan:  Oriented to our collaborative group.  S&S of Labor/PROM  reinforced.  Continue home meds/daily PNV, Iron.  NIPT negative x 3, male.  EIF on Anatomy u/s otherwise normal.  Size greater than dates, growth u/s at 35w6d 6#6oz, 44%.   Tachycardia at initial visit. Pt had  EKG and echo with Dr. Foote, WNL per pt report.  GBS Pos, intrapartum abx reviewed.  RPR and CBC next visit.  Weekly SUMI until delivery.

## 2023-01-11 ENCOUNTER — ROUTINE PRENATAL (OUTPATIENT)
Dept: OBSTETRICS AND GYNECOLOGY | Facility: CLINIC | Age: 28
End: 2023-01-11
Payer: MEDICAID

## 2023-01-11 VITALS
SYSTOLIC BLOOD PRESSURE: 124 MMHG | WEIGHT: 206.81 LBS | BODY MASS INDEX: 34.41 KG/M2 | DIASTOLIC BLOOD PRESSURE: 81 MMHG

## 2023-01-11 DIAGNOSIS — B95.1 POSITIVE GBS TEST: ICD-10-CM

## 2023-01-11 DIAGNOSIS — R00.0 TACHYCARDIA: ICD-10-CM

## 2023-01-11 DIAGNOSIS — Z3A.38 38 WEEKS GESTATION OF PREGNANCY: Primary | ICD-10-CM

## 2023-01-11 PROCEDURE — 59426 PR ANTEPARTUM CARE ONLY, >7 VISITS: ICD-10-PCS | Mod: TH,S$GLB,,

## 2023-01-11 PROCEDURE — 59426 ANTEPARTUM CARE ONLY: CPT | Mod: TH,S$GLB,,

## 2023-01-11 NOTE — PROGRESS NOTES
2023  27 y.o. 38w0d Estimated Date of Delivery: 23, per 1st trimester u/s at 11w 6d on 22. Transfer from [facility].   OB History    Para Term  AB Living   1             SAB IAB Ectopic Multiple Live Births                  # Outcome Date GA Lbr Paul/2nd Weight Sex Delivery Anes PTL Lv   1 Current              Here for scheduled SUMI visit. Doing well.  No lof/brvb, dysuria, fever/chills, nausea or emesis. No S&S of pre eclampsia or COIVD 19. Good FM noted. No cramps/regular contractions. Calm, pleasant, NAD. ROS negative with exception of aforementioned:    At initial OB patient noted to have tachycardia with regular rhythm. She requested cardiology referral at that time. Pt is seeing Dr. Foote for cardiology. Pt has been consulted by cardiology and found to have normal EKG and echo. She is scheduled for follow up cardiology in 2023.     Growth u/s at 35w6d EFW 6#6oz 44%; AC 57%. Normal interval growth.     Birth plan reviewed. Pt desires spontaneous labor. Planning epidural in labor.    GBS positive; No allergies. Plan PCN prophylaxis in labor. Pt aware.    Pt considering IOL at 39 weeks. VE today 1/20/-3. Not favorable at this time for IOL. We will reassess VE in 1 week. Pt verbalizes understanding.     Allergies:  None    PMH:  None    Surghx:  Ary teeth extraction  Breast reduction    SOCHX:  Denies tobacco, etoh, substance use.    OBHX:    Hx of breast reduction, pt intersted in seeing lactation consultant  Size greater than dates, pt states she weighed 10# at birth.  Growth u/s at 35w6d EFW 6#6oz 44%; AC 57%. Normal interval growth.   Anemia  Maternal sinus tachycardia; normal EKG and Echo with cardiology    LABS:  A pos/ ABS neg  CBC 11.2 hgb; plt 357  Rubella immune  HIV Neg  Hep B C Neg  RPR Non-reactive  GC/CHL neg  Urine cx no growth  Pap on 10/22/21 NILM  M21 Negative x 3, Male  UDS Neg  GTT 98  CBC 9.3/31.3/296 - pt now taking daily ferrous  sulfate  Sickle Cell Neg  GBS POS  RPR and CBC next visit    Review of Systems:  General ROS: negative for headache or visual changes  Breast ROS: negative for breast lumps  Gastrointestinal ROS: negative for constipation, diarrhea or nausea/vomiting  Musculoskeletal ROS: negative for pain in joints or swelling in face or hands.   Neurological ROS: negative for - headaches, numbness/tingling or visual changes      Physical Exam:  /81   Wt 93.8 kg (206 lb 12.8 oz)   LMP 04/02/2022   BMI 34.41 kg/m²   Urine Dip: neg/neg  FHT: Fetal Heart Rate: 140s    Constitutional: She is oriented to person, place, and time. She appears well-developed and well-nourished. No distress.   Pulmonary/Chest: Effort normal. No respiratory distress.    Abdominal: Soft, gravid, nontender. No rebound and no guarding. Fundal Height: Fundal Height (cm): 39 cm S=D  Genitourinary: VE: 1/20/-3 no blood or fluid on glove. EYISON Camacho present as chaperone.  Musculoskeletal: Normal range of motion. Minimal peripheral edema.   Neurological: She is alert and oriented to person, place, and time. Coordination normal. Gait smooth and steady  Skin: Skin is warm and dry. She is not diaphoretic.  Psychiatric: She has a normal mood and affect.      Assessment:   27 y.o., at 38w0d Gestation   Patient Active Problem List   Diagnosis    Tachycardia    Positive GBS test    38 weeks gestation of pregnancy     Current Outpatient Medications on File Prior to Visit   Medication Sig Dispense Refill    ferrous sulfate 325 (65 FE) MG EC tablet Take 1 tablet (325 mg total) by mouth once daily. 30 tablet 5    prenatal 25/iron fum/folic/dha (PRENATAL-1 ORAL) Take 1 tablet by mouth once daily.       No current facility-administered medications on file prior to visit.       Plan:  Oriented to our collaborative group.  S&S of Labor/PROM  reinforced.  Continue home meds/daily PNV, Iron.  NIPT negative x 3, male.  EIF on Anatomy u/s otherwise normal.  Size greater than  dates, growth u/s at 35w6d 6#6oz, 44%.   Tachycardia at initial visit. Pt had EKG and echo with MINH Randall per pt report.  GBS Pos, intrapartum abx reviewed.  RPR and CBC next visit.  Weekly SUMI until delivery.

## 2023-01-16 PROBLEM — Z3A.38 38 WEEKS GESTATION OF PREGNANCY: Status: ACTIVE | Noted: 2023-01-16

## 2023-01-16 PROBLEM — Z3A.37 37 WEEKS GESTATION OF PREGNANCY: Status: RESOLVED | Noted: 2023-01-04 | Resolved: 2023-01-16

## 2023-01-17 ENCOUNTER — LAB VISIT (OUTPATIENT)
Dept: LAB | Facility: CLINIC | Age: 28
End: 2023-01-17
Payer: MEDICAID

## 2023-01-17 ENCOUNTER — ROUTINE PRENATAL (OUTPATIENT)
Dept: OBSTETRICS AND GYNECOLOGY | Facility: CLINIC | Age: 28
End: 2023-01-17
Payer: MEDICAID

## 2023-01-17 VITALS
BODY MASS INDEX: 34.01 KG/M2 | WEIGHT: 204.38 LBS | DIASTOLIC BLOOD PRESSURE: 79 MMHG | SYSTOLIC BLOOD PRESSURE: 138 MMHG

## 2023-01-17 DIAGNOSIS — D50.8 OTHER IRON DEFICIENCY ANEMIA: ICD-10-CM

## 2023-01-17 DIAGNOSIS — B95.1 POSITIVE GBS TEST: ICD-10-CM

## 2023-01-17 DIAGNOSIS — R00.0 TACHYCARDIA: ICD-10-CM

## 2023-01-17 DIAGNOSIS — Z3A.38 38 WEEKS GESTATION OF PREGNANCY: Primary | ICD-10-CM

## 2023-01-17 DIAGNOSIS — Z3A.38 38 WEEKS GESTATION OF PREGNANCY: ICD-10-CM

## 2023-01-17 LAB
BASOPHILS # BLD AUTO: 0.09 K/UL (ref 0–0.2)
BASOPHILS NFR BLD: 0.6 % (ref 0–1.9)
DIFFERENTIAL METHOD: ABNORMAL
EOSINOPHIL # BLD AUTO: 0.1 K/UL (ref 0–0.5)
EOSINOPHIL NFR BLD: 0.7 % (ref 0–8)
ERYTHROCYTE [DISTWIDTH] IN BLOOD BY AUTOMATED COUNT: 18.4 % (ref 11.5–14.5)
HCT VFR BLD AUTO: 30.7 % (ref 37–48.5)
HGB BLD-MCNC: 8.8 G/DL (ref 12–16)
IMM GRANULOCYTES # BLD AUTO: 0.45 K/UL (ref 0–0.04)
IMM GRANULOCYTES NFR BLD AUTO: 2.9 % (ref 0–0.5)
LYMPHOCYTES # BLD AUTO: 1.8 K/UL (ref 1–4.8)
LYMPHOCYTES NFR BLD: 11.9 % (ref 18–48)
MCH RBC QN AUTO: 19.5 PG (ref 27–31)
MCHC RBC AUTO-ENTMCNC: 28.7 G/DL (ref 32–36)
MCV RBC AUTO: 68 FL (ref 82–98)
MONOCYTES # BLD AUTO: 1 K/UL (ref 0.3–1)
MONOCYTES NFR BLD: 6.3 % (ref 4–15)
NEUTROPHILS # BLD AUTO: 11.9 K/UL (ref 1.8–7.7)
NEUTROPHILS NFR BLD: 77.6 % (ref 38–73)
NRBC BLD-RTO: 0 /100 WBC
PLATELET # BLD AUTO: 272 K/UL (ref 150–450)
PMV BLD AUTO: 9.6 FL (ref 9.2–12.9)
RBC # BLD AUTO: 4.52 M/UL (ref 4–5.4)
WBC # BLD AUTO: 15.4 K/UL (ref 3.9–12.7)

## 2023-01-17 PROCEDURE — 59426 PR ANTEPARTUM CARE ONLY, >7 VISITS: ICD-10-PCS | Mod: TH,S$GLB,,

## 2023-01-17 PROCEDURE — 59426 ANTEPARTUM CARE ONLY: CPT | Mod: TH,S$GLB,,

## 2023-01-17 PROCEDURE — 36415 PR COLLECTION VENOUS BLOOD,VENIPUNCTURE: ICD-10-PCS | Mod: ,,, | Performed by: STUDENT IN AN ORGANIZED HEALTH CARE EDUCATION/TRAINING PROGRAM

## 2023-01-17 PROCEDURE — 36415 COLL VENOUS BLD VENIPUNCTURE: CPT | Mod: ,,, | Performed by: STUDENT IN AN ORGANIZED HEALTH CARE EDUCATION/TRAINING PROGRAM

## 2023-01-17 PROCEDURE — 86592 SYPHILIS TEST NON-TREP QUAL: CPT

## 2023-01-17 PROCEDURE — 85025 COMPLETE CBC W/AUTO DIFF WBC: CPT

## 2023-01-17 NOTE — PROGRESS NOTES
2023  27 y.o. 38w6d Estimated Date of Delivery: 23, per 1st trimester u/s at 11w 6d on 22. Transfer from [facility].   OB History    Para Term  AB Living   1             SAB IAB Ectopic Multiple Live Births                  # Outcome Date GA Lbr Paul/2nd Weight Sex Delivery Anes PTL Lv   1 Current              Here for scheduled SUMI visit. Doing well.  No lof/brvb, dysuria, fever/chills, nausea or emesis. No S&S of pre eclampsia or COIVD 19. Good FM noted. No cramps/regular contractions. Calm, pleasant, NAD. ROS negative with exception of aforementioned:    At initial OB patient noted to have tachycardia with regular rhythm. She requested cardiology referral at that time. Pt is seeing Dr. Foote for cardiology. Pt has been consulted by cardiology and found to have normal EKG and echo. She is scheduled for follow up cardiology in 2023.     Growth u/s at 35w6d EFW 6#6oz 44%; AC 57%. Normal interval growth.     Birth plan reviewed. Pt desires spontaneous labor. Planning epidural in labor.    GBS positive; No allergies. Plan PCN prophylaxis in labor. Pt aware.    Pt strongly desires IOL at 39 weeks. VE: 1.5/50/-3 soft; mid position. Risks/benefits/alternatives to IOL reviewed. Pt wishes to proceed. IOL 2023 at 0500 scheduled. Pitocin + cooks balloon. Pt verbalizes understanding of plan.     Allergies:  None    PMH:  None    Surghx:  [place] teeth extraction  Breast reduction    SOCHX:  Denies tobacco, etoh, substance use.    OBHX:    Hx of breast reduction, pt intersted in seeing lactation consultant  Size greater than dates, pt states she weighed 10# at birth.  Growth u/s at 35w6d EFW 6#6oz 44%; AC 57%. Normal interval growth.   Anemia  Maternal sinus tachycardia; normal EKG and Echo with cardiology    LABS:  A pos/ ABS neg  CBC 11.2 hgb; plt 357  Rubella immune  HIV Neg  Hep B C Neg  RPR Non-reactive  GC/CHL neg  Urine cx no growth  Pap on 10/22/21 NILM  M21 Negative x  3, Male  UDS Neg  GTT 98  CBC 9.3/31.3/296 - pt now taking daily ferrous sulfate  Sickle Cell Neg  GBS POS  RPR and CBC today    Review of Systems:  General ROS: negative for headache or visual changes  Breast ROS: negative for breast lumps  Gastrointestinal ROS: negative for constipation, diarrhea or nausea/vomiting  Musculoskeletal ROS: negative for pain in joints or swelling in face or hands.   Neurological ROS: negative for - headaches, numbness/tingling or visual changes      Physical Exam:  /79   Wt 92.7 kg (204 lb 6.4 oz)   LMP 04/02/2022   BMI 34.01 kg/m²   Urine Dip: neg/neg  FHT: Fetal Heart Rate: 150s    Constitutional: She is oriented to person, place, and time. She appears well-developed and well-nourished. No distress.   Pulmonary/Chest: Effort normal. No respiratory distress.    Abdominal: Soft, gravid, nontender. No rebound and no guarding. Fundal Height: Fundal Height (cm): 38 cm S=D  Genitourinary: VE: 1.5/50/-3 no blood or fluid on glove.  Musculoskeletal: Normal range of motion. Minimal peripheral edema.   Neurological: She is alert and oriented to person, place, and time. Coordination normal. Gait smooth and steady  Skin: Skin is warm and dry. She is not diaphoretic.  Psychiatric: She has a normal mood and affect.      Assessment:   27 y.o., at 38w6d Gestation   Patient Active Problem List   Diagnosis    Tachycardia    Positive GBS test    38 weeks gestation of pregnancy     Current Outpatient Medications on File Prior to Visit   Medication Sig Dispense Refill    ferrous sulfate 325 (65 FE) MG EC tablet Take 1 tablet (325 mg total) by mouth once daily. 30 tablet 5    prenatal 25/iron fum/folic/dha (PRENATAL-1 ORAL) Take 1 tablet by mouth once daily.       No current facility-administered medications on file prior to visit.       Plan:  1. Oriented to our collaborative group.  2. S&S of Labor/PROM  reinforced.  3. Continue home meds/daily PNV, Iron.  4. NIPT negative x 3,  male.  5. EIF on Anatomy u/s otherwise normal.  6. Size greater than dates, growth u/s at 35w6d 6#6oz, 44%.   7. Tachycardia at initial visit. Pt had EKG and echo with MINH Randall per pt report.  8. GBS Pos, intrapartum abx reviewed.  9. RPR and CBC today.  10. IOL scheduled for 01/18/2023.  11. Make postpartum visit 4-6 weeks.

## 2023-01-18 ENCOUNTER — OUTSIDE PLACE OF SERVICE (OUTPATIENT)
Dept: OBSTETRICS AND GYNECOLOGY | Facility: CLINIC | Age: 28
End: 2023-01-18
Payer: MEDICAID

## 2023-01-18 ENCOUNTER — OUTSIDE PLACE OF SERVICE (OUTPATIENT)
Dept: OBSTETRICS AND GYNECOLOGY | Facility: CLINIC | Age: 28
End: 2023-01-18

## 2023-01-18 LAB — RPR SER QL: NORMAL

## 2023-01-19 ENCOUNTER — OUTSIDE PLACE OF SERVICE (OUTPATIENT)
Dept: OBSTETRICS AND GYNECOLOGY | Facility: CLINIC | Age: 28
End: 2023-01-19

## 2023-01-19 ENCOUNTER — OUTSIDE PLACE OF SERVICE (OUTPATIENT)
Dept: OBSTETRICS AND GYNECOLOGY | Facility: CLINIC | Age: 28
End: 2023-01-19
Payer: MEDICAID

## 2023-01-19 PROCEDURE — 59410 OBSTETRICAL CARE: CPT | Mod: TH,,, | Performed by: ADVANCED PRACTICE MIDWIFE

## 2023-01-19 PROCEDURE — 59410 PR OBSTE CARE,VAG DELIV+POSTPARTUM: ICD-10-PCS | Mod: TH,,, | Performed by: ADVANCED PRACTICE MIDWIFE

## 2023-01-20 ENCOUNTER — OUTSIDE PLACE OF SERVICE (OUTPATIENT)
Dept: OBSTETRICS AND GYNECOLOGY | Facility: CLINIC | Age: 28
End: 2023-01-20
Payer: MEDICAID

## 2023-01-24 ENCOUNTER — PATIENT MESSAGE (OUTPATIENT)
Dept: OBSTETRICS AND GYNECOLOGY | Facility: CLINIC | Age: 28
End: 2023-01-24
Payer: MEDICAID

## 2023-03-01 ENCOUNTER — POSTPARTUM VISIT (OUTPATIENT)
Dept: OBSTETRICS AND GYNECOLOGY | Facility: CLINIC | Age: 28
End: 2023-03-01
Payer: MEDICAID

## 2023-03-01 ENCOUNTER — PATIENT MESSAGE (OUTPATIENT)
Dept: OBSTETRICS AND GYNECOLOGY | Facility: CLINIC | Age: 28
End: 2023-03-01

## 2023-03-01 VITALS
WEIGHT: 181.63 LBS | SYSTOLIC BLOOD PRESSURE: 125 MMHG | DIASTOLIC BLOOD PRESSURE: 78 MMHG | BODY MASS INDEX: 30.26 KG/M2 | HEIGHT: 65 IN

## 2023-03-01 DIAGNOSIS — F41.9 ANXIETY: ICD-10-CM

## 2023-03-01 DIAGNOSIS — Z30.09 ENCOUNTER FOR COUNSELING REGARDING CONTRACEPTION: Primary | ICD-10-CM

## 2023-03-01 DIAGNOSIS — Z30.09 ENCOUNTER FOR COUNSELING REGARDING CONTRACEPTION: ICD-10-CM

## 2023-03-01 PROBLEM — Z3A.38 38 WEEKS GESTATION OF PREGNANCY: Status: RESOLVED | Noted: 2023-01-16 | Resolved: 2023-03-01

## 2023-03-01 PROBLEM — B95.1 POSITIVE GBS TEST: Status: RESOLVED | Noted: 2023-01-04 | Resolved: 2023-03-01

## 2023-03-01 LAB
B-HCG UR QL: NEGATIVE
CTP QC/QA: YES

## 2023-03-01 PROCEDURE — 0503F POSTPARTUM CARE VISIT: CPT | Mod: CPTII,S$GLB,,

## 2023-03-01 PROCEDURE — 81025 URINE PREGNANCY TEST: CPT | Mod: S$GLB,,,

## 2023-03-01 PROCEDURE — 81025 POCT URINE PREGNANCY: ICD-10-PCS | Mod: S$GLB,,,

## 2023-03-01 PROCEDURE — 0503F PR POSTPARTUM CARE VISIT: ICD-10-PCS | Mod: CPTII,S$GLB,,

## 2023-03-01 RX ORDER — NORELGESTROMIN AND ETHINYL ESTRADIOL 35; 150 UG/MG; UG/MG
1 PATCH TRANSDERMAL WEEKLY
Qty: 4 PATCH | Refills: 11 | Status: SHIPPED | OUTPATIENT
Start: 2023-03-01 | End: 2023-03-01 | Stop reason: SDUPTHER

## 2023-03-01 RX ORDER — ESCITALOPRAM OXALATE 10 MG/1
10 TABLET ORAL DAILY
Qty: 30 TABLET | Refills: 11 | Status: SHIPPED | OUTPATIENT
Start: 2023-03-01 | End: 2023-03-29

## 2023-03-01 RX ORDER — NORELGESTROMIN AND ETHINYL ESTRADIOL 35; 150 UG/MG; UG/MG
1 PATCH TRANSDERMAL WEEKLY
Qty: 4 PATCH | Refills: 11 | Status: SHIPPED | OUTPATIENT
Start: 2023-03-01 | End: 2024-02-29

## 2023-03-01 NOTE — PROGRESS NOTES
"CC: Post-partum follow-up    Lotus Dover is a 27 y.o. female  who presents for post-partum visit.  She is S/P normal spontaneous vaginal delivery.  Patient without complaints.  Pain controlled.  Tolerating p.o..  Positive ambulation. Positive flatus.  Bleeding is controlled.  No depression. Pt reports generalized anxiety. Denies suicidal and homicidal ideations. Has been on zoloft in the past. Pt desires to avoid weight gain with SSRIs. Discussed Lexapro, patient desires to try Lexapro for anxiety. Rx for Lexapro 10 mg.  No abuse.    Delivery Date: 2023  Delivery MD: DIEGO Palacios CNM  Lacerations: 2nd degree vaginal floor, 1st degree left periurethral, clitoral laceration  Gender: male  Breast Feeding: bottle  Depression: no. Yes anxiety.  Contraception: Ortho-Evra  Facility: [facility]    Pregnancy was complicated by:  Antepartum anemia      Current Outpatient Medications:     EScitalopram oxalate (LEXAPRO) 10 MG tablet, Take 1 tablet (10 mg total) by mouth once daily., Disp: 30 tablet, Rfl: 11    ferrous sulfate 325 (65 FE) MG EC tablet, Take 1 tablet (325 mg total) by mouth once daily., Disp: 30 tablet, Rfl: 5    norelgestromin-ethinyl estradiol 150-35 mcg/24 hr, Place 1 patch onto the skin once a week., Disp: 4 patch, Rfl: 11    prenatal 25/iron fum/folic/dha (PRENATAL-1 ORAL), Take 1 tablet by mouth once daily., Disp: , Rfl:      ROS:  GENERAL: No fever, chills, fatigability.  VULVAR: No pain, no lesions and no itching.  VAGINAL: No relaxation, no itching, no discharge, no abnormal bleeding and no lesions.  ABDOMEN: No abdominal pain. Denies nausea. Denies vomiting. No diarrhea. No constipation  BREAST: Denies pain. No lumps.  URINARY: No incontinence, no nocturia, no frequency and no dysuria.  CARDIOVASCULAR: No chest pain. No shortness of breath. No leg cramps.  NEUROLOGICAL: No headaches. No vision changes.      PHYSICAL EXAM:  /78   Ht 5' 5" (1.651 m)   Wt 82.4 kg (181 " lb 9.6 oz)   LMP 04/02/2022 (Exact Date)   Breastfeeding No   BMI 30.22 kg/m²    Exam chaperoned by nurse  General:  Well-developed, well-nourished female in no acute distress  HEENT:  Normocephalic, atraumatic, extraocular muscles intact  Breasts:  Nontender, no masses, bilaterally symmetric  Abdomen:  Soft, nontender, nondistended, fundus firm and below umbilicus  Extremities:  Warm, dry, no clubbing/cyanosis/edema  Neurologic:  Cranial nerves 2-12 grossly intact  Dermatologic:  No rashes/lesions/bruising    IMP:  Status post normal spontaneous vaginal delivery.    PLAN:  Doing well.  Depression precautions discussed.  Method of contraception discussed and patient has decided.  Pregnancy spacing discussed.  Patient expressed understanding.  Return for annual exam.

## 2023-03-29 ENCOUNTER — OFFICE VISIT (OUTPATIENT)
Dept: OBSTETRICS AND GYNECOLOGY | Facility: CLINIC | Age: 28
End: 2023-03-29
Payer: MEDICAID

## 2023-03-29 DIAGNOSIS — F90.8 ATTENTION DEFICIT HYPERACTIVITY DISORDER (ADHD), OTHER TYPE: Primary | ICD-10-CM

## 2023-03-29 DIAGNOSIS — F32.89 OTHER DEPRESSION: ICD-10-CM

## 2023-03-29 PROBLEM — F90.9 ATTENTION DEFICIT HYPERACTIVITY DISORDER (ADHD): Status: ACTIVE | Noted: 2023-03-29

## 2023-03-29 PROBLEM — F32.A DEPRESSION: Status: ACTIVE | Noted: 2023-03-29

## 2023-03-29 PROCEDURE — 1159F MED LIST DOCD IN RCRD: CPT | Mod: CPTII,S$GLB,,

## 2023-03-29 PROCEDURE — 1159F PR MEDICATION LIST DOCUMENTED IN MEDICAL RECORD: ICD-10-PCS | Mod: CPTII,S$GLB,,

## 2023-03-29 PROCEDURE — 99213 OFFICE O/P EST LOW 20 MIN: CPT | Mod: S$GLB,,,

## 2023-03-29 PROCEDURE — 99213 PR OFFICE/OUTPT VISIT, EST, LEVL III, 20-29 MIN: ICD-10-PCS | Mod: S$GLB,,,

## 2023-03-29 RX ORDER — DEXMETHYLPHENIDATE HYDROCHLORIDE 10 MG/1
10 CAPSULE, EXTENDED RELEASE ORAL DAILY
Qty: 30 CAPSULE | Refills: 0 | Status: SHIPPED | OUTPATIENT
Start: 2023-03-29 | End: 2023-04-28

## 2023-03-29 NOTE — PROGRESS NOTES
HISTORY OF PRESENT ILLNESS:    Lotus Dover is a 27 y.o. female, , Patient's last menstrual period was 2023 (exact date).,  presents for a depression/ADHD follow up. Pt prescribed lexapro 10 mg at postpartum visit due to depression and anxiety. Pt endorses long history of ADHD that has required treatment since childhood. Prior to pregnancy she was taking 10 mg of adderall per day. In the past she has been treated with Ritalin, Vyvanse, Concerta and Adderall. Adderrall provided the best relief of her symptoms. We discussed adderall shortage at this time and reviewed many pharmacies do not have this in stock. Patient would like to try alternative stimulant to relieve her ADHD and depression. She states Vyvanse worked the second best. We also discussed Focalin which she states her mother currently takes and has good symptom relief. Rx for Focalin 10 mg XR provided.     Pt no longer taking Lexapro as it worsened her symptoms of depression. Patient denies thoughts of self harm or harm to others.     Pt is due for a pap at this time however she is on her menstrual cycle. Pt to follow up in 2 weeks for well woman visit and pap smear.     History reviewed. No pertinent past medical history.    Past Surgical History:   Procedure Laterality Date    REDUCTION OF BOTH BREASTS Bilateral     5 yrs ago    WISDOM TOOTH EXTRACTION         MEDICATIONS AND ALLERGIES:      Current Outpatient Medications:     dexmethylphenidate (FOCALIN XR) 10 MG 24 hr capsule, Take 1 capsule (10 mg total) by mouth once daily., Disp: 30 capsule, Rfl: 0    ferrous sulfate 325 (65 FE) MG EC tablet, Take 1 tablet (325 mg total) by mouth once daily., Disp: 30 tablet, Rfl: 5    norelgestromin-ethinyl estradiol 150-35 mcg/24 hr, Place 1 patch onto the skin once a week., Disp: 4 patch, Rfl: 11    Review of patient's allergies indicates:  No Known Allergies    Family History   Problem Relation Age of Onset    Ovarian cancer Mother        Social  History     Socioeconomic History    Marital status: Single   Tobacco Use    Smoking status: Never     Passive exposure: Never    Smokeless tobacco: Never   Substance and Sexual Activity    Alcohol use: Not Currently    Drug use: Never    Sexual activity: Yes     Partners: Male       ROS:  GENERAL: No weight changes. No swelling. No fatigue. No fever. + Depression + ADHD  CARDIOVASCULAR: No chest pain. No shortness of breath. No leg cramps.   NEUROLOGICAL: No headaches. No vision changes.  BREASTS: No pain. No lumps. No discharge.  ABDOMEN: No pain. No nausea. No vomiting. No diarrhea. No constipation.  REPRODUCTIVE: No abnormal bleeding.   VULVA: No pain. No lesions. No itching.  VAGINA: No relaxation. No itching. No odor. No discharge. No lesions.  URINARY: No incontinence. No nocturia. No frequency. No dysuria.    LMP 03/28/2023 (Exact Date)   Breastfeeding No     PE:  APPEARANCE: Well nourished, well developed, in no acute distress.  ABDOMEN: Soft. No tenderness or masses. No hepatosplenomegaly. No hernias.  BREASTS, FUNDOSCOPIC, RECTAL DEFERRED  PELVIC: Deferred  EXTREMITIES: No edema      DIAGNOSIS & PLAN  1. Attention deficit hyperactivity disorder (ADHD), other type  dexmethylphenidate (FOCALIN XR) 10 MG 24 hr capsule      2. Other depression  dexmethylphenidate (FOCALIN XR) 10 MG 24 hr capsule              COUNSELING:           addressing problems separate from annual exam.  This includes face to face time and non-face to face time preparing to see the patient (eg, review of tests), Obtaining and/or reviewing separately obtained history, Documenting clinical information in the electronic or other health record, Independently interpreting resultsand communicating results to the patient/family/caregiver, or Care coordination.